# Patient Record
(demographics unavailable — no encounter records)

---

## 2025-01-04 NOTE — HISTORY OF PRESENT ILLNESS
[FreeTextEntry1] : edilson [de-identified] : flu vac done dec hyperpig cheeks/ neck Otherwise feels good. Appet, sleep, bms, voiding, mood all ok. no pain.  Reviewed all interim as well as relevant prior consultations, labs and radiological studies.

## 2025-01-04 NOTE — HEALTH RISK ASSESSMENT
[Audit-CScore] : 0 [de-identified] : healthy overall [de-identified] : walks [[id number]] : 0 [Change in mental status noted] : No change in mental status noted [Language] : denies difficulty with language [Behavior] : denies difficulty with behavior [Handling Complex Tasks] : denies difficulty handling complex tasks [Reasoning] : denies difficulty with reasoning [Reports changes in hearing] : Reports no changes in hearing [Reports changes in vision] : Reports no changes in vision [Reports changes in dental health] : Reports no changes in dental health [MammogramDate] : 1/2023 [PapSmearDate] : 2/2024 [AdvancecareDate] : 1/2/25

## 2025-01-04 NOTE — HISTORY OF PRESENT ILLNESS
[FreeTextEntry1] : edilson [de-identified] : flu vac done dec hyperpig cheeks/ neck Otherwise feels good. Appet, sleep, bms, voiding, mood all ok. no pain.  Reviewed all interim as well as relevant prior consultations, labs and radiological studies.

## 2025-01-04 NOTE — HEALTH RISK ASSESSMENT
[Audit-CScore] : 0 [de-identified] : walks [de-identified] : healthy overall [[id number]] : 0 [Change in mental status noted] : No change in mental status noted [Language] : denies difficulty with language [Behavior] : denies difficulty with behavior [Handling Complex Tasks] : denies difficulty handling complex tasks [Reasoning] : denies difficulty with reasoning [Reports changes in hearing] : Reports no changes in hearing [Reports changes in vision] : Reports no changes in vision [Reports changes in dental health] : Reports no changes in dental health [MammogramDate] : 1/2023 [PapSmearDate] : 2/2024 [AdvancecareDate] : 1/2/25

## 2025-01-04 NOTE — ASSESSMENT
[FreeTextEntry1] : HTN- cont amlo, low NA DIET, exc, wl Ulcerative Colitis- stable, cont mesalamine, GI f/u Obesity- discussed low calorie diet and exercise as part of a weight loss plan.zepbound trial melasma- hydroqin, derm eval Discussed healthy lifestyle, d/e, immunizations, gyn, sbe, mammo. Chk labs. Discussed the importance and benefit of a healthy lifestyle including a heart healthy diet such as the Mediterranean diet and regular exercise as well as 7 hours of sleep nightly. Discussed the importance of screening for colon cancer. Reviewed screening reccomendations including colonoscopy, Cologuard and Fit DNA testing. I strongly encouraged colonoscopy as that is the best screening test to detect both colon cancer and polyps and is the gold standard.